# Patient Record
(demographics unavailable — no encounter records)

---

## 2024-11-08 NOTE — HEALTH RISK ASSESSMENT
[Good] : ~his/her~  mood as  good [Yes] : Yes [Monthly or less (1 pt)] : Monthly or less (1 point) [1 or 2 (0 pts)] : 1 or 2 (0 points) [Never (0 pts)] : Never (0 points) [No] : In the past 12 months have you used drugs other than those required for medical reasons? No [0] : 2) Feeling down, depressed, or hopeless: Not at all (0) [PHQ-2 Negative - No further assessment needed] : PHQ-2 Negative - No further assessment needed [Audit-CScore] : 1 [de-identified] : improving [de-identified] : needs improvement [[id number]] : 0 [Former] : Former [0-4] : 0-4 [< 15 Years] : < 15 Years [de-identified] : 10 yrs 3cigs/day, quit 2009 [Patient reported mammogram was normal] : Patient reported mammogram was normal [Patient reported PAP Smear was normal] : Patient reported PAP Smear was normal [HIV test declined] : HIV test declined [Hepatitis C test declined] : Hepatitis C test declined [Alone] : lives alone [Employed] : employed [Significant Other] : lives with significant other [Sexually Active] : sexually active [High Risk Behavior] : no high risk behavior [Feels Safe at Home] : Feels safe at home [Fully functional (bathing, dressing, toileting, transferring, walking, feeding)] : Fully functional (bathing, dressing, toileting, transferring, walking, feeding) [Fully functional (using the telephone, shopping, preparing meals, housekeeping, doing laundry, using] : Fully functional and needs no help or supervision to perform IADLs (using the telephone, shopping, preparing meals, housekeeping, doing laundry, using transportation, managing medications and managing finances) [Reports changes in hearing] : Reports no changes in hearing [Reports changes in vision] : Reports no changes in vision [Reports changes in dental health] : Reports no changes in dental health [MammogramDate] : 12/23 [PapSmearDate] : 01/24

## 2024-11-08 NOTE — ASSESSMENT
[FreeTextEntry1] : Annual physical: f/u routine labwork Hx of abnormal EKG: low voltage, f/u cardiology Anxiety: recommend exercise, yoga, meditation Obesity: Recommend low fat diet, wt loss, exercise, and DASH diet, refer to nutritionist HTN: bp reviewed, c/w current regimen, recommend low salt diet, wt loss, exercise, DASH diet HLD: Recommend low fat diet, wt loss, exercise, nutritional counseling provided, f/u lipid panel ordered, c/w statin therapy Prediabetes: Recommend low carb diet, wt loss, exercise, f/u a1c ordered PCOS: Recommend weight-loss strategies using calorie-restricted diets combined with exercise. Colon CA screening: f/u colonoscopy RTC 3 wks

## 2024-11-08 NOTE — COUNSELING
[Behavioral health counseling provided] : Behavioral health counseling provided [Potential consequences of obesity discussed] : Potential consequences of obesity discussed [Benefits of weight loss discussed] : Benefits of weight loss discussed [Encouraged to maintain food diary] : Encouraged to maintain food diary [Encouraged to increase physical activity] : Encouraged to increase physical activity [Encouraged to use exercise tracking device] : Encouraged to use exercise tracking device

## 2024-11-08 NOTE — HISTORY OF PRESENT ILLNESS
[FreeTextEntry1] : cpe [de-identified] : 47 yo female presents for annual physical. Reports feeling well. Denies fever, chills, cp, palpitations, sob, nvcd.

## 2025-06-20 NOTE — ASSESSMENT
[FreeTextEntry1] : HTN: bp reviewed, refill losartan 100 mg po daily, refill amlodipine 5 mg po daily, recommend low salt diet, wt loss, exercise, DASH diet HLD/hypertriglyceridemia: lipid profile 11/24 reviewed, refill atorvastatin 20 mg po daily, Recommend low fat diet, wt loss, exercise, and DASH diet. Prediabetes: a1c 11/24 reviewed, Recommend low carb diet, wt loss, exercise, f/u a1c ordered RTC 4 wks

## 2025-06-20 NOTE — HISTORY OF PRESENT ILLNESS
[FreeTextEntry1] : follow up [de-identified] : 50 yo female presents for follow up of HTN, HLD, prediabetes. reports feeling well. Denies fever, chills, cp, palpitations, sob, nvcd.